# Patient Record
Sex: FEMALE | Race: WHITE | NOT HISPANIC OR LATINO | URBAN - METROPOLITAN AREA
[De-identification: names, ages, dates, MRNs, and addresses within clinical notes are randomized per-mention and may not be internally consistent; named-entity substitution may affect disease eponyms.]

---

## 2021-03-23 ENCOUNTER — BULK ORDERING (OUTPATIENT)
Dept: CASE MANAGEMENT | Facility: OTHER | Age: 56
End: 2021-03-23

## 2021-03-23 DIAGNOSIS — Z23 IMMUNIZATION DUE: ICD-10-CM

## 2023-12-04 ENCOUNTER — OFFICE VISIT (OUTPATIENT)
Dept: ENDOCRINOLOGY | Facility: CLINIC | Age: 58
End: 2023-12-04
Payer: COMMERCIAL

## 2023-12-04 VITALS
HEART RATE: 89 BPM | DIASTOLIC BLOOD PRESSURE: 90 MMHG | HEIGHT: 68 IN | BODY MASS INDEX: 25.85 KG/M2 | OXYGEN SATURATION: 99 % | WEIGHT: 170.6 LBS | SYSTOLIC BLOOD PRESSURE: 138 MMHG

## 2023-12-04 DIAGNOSIS — E04.2 MULTIPLE THYROID NODULES: Primary | ICD-10-CM

## 2023-12-04 RX ORDER — TIRZEPATIDE 5 MG/.5ML
INJECTION, SOLUTION SUBCUTANEOUS
COMMUNITY
Start: 2023-10-31

## 2023-12-04 NOTE — PROGRESS NOTES
Chief Complaint   Patient presents with    Thyroid Nodule        Referring Provider  Shireen Landrum,*     HPI   Martina Rodríguez is a 58 y.o. female had concerns including Thyroid Nodule.   Seen as a new patient.  Thyroid Nodule.    She is currently taking Mounjaro and they did an US Thyroid as preventative care and noticed thyroid nodule. They sent her here for further eval.  She denies any difficulty swallowing.  She has not noted changes to her neck.      US Thyroid: 04/2023  Findings: The longitudinal and transverse sonograms of the thyroid show approximately a 5 mm calcified nodule in the right lobe.  This may be due to a calcified goiter.  The left lobe has approximately 1 cm x 6.3 mm hypoechoic nodule.  This may be due to goiter or any other nodule.  Another 3.2 mm x 4.2 mm cyst is noted in the left lobe.  The isthmus is remarkable.  The right lobe measures 4.25 x 1.25 x 1.25 cm.  The left lobe measures 3.9 cm x 1.3 cm x 1.6 cm.  Conclusion: Bilateral thyroid nodules as mentioned above.  Clinical correlation and follow-up will be useful.    [place]  Previous history of radiation to face/neck: none  Consuming foods high in iodine: none  Family history of thyroid complications:none    The following portions of the patient's history were reviewed and updated as appropriate: allergies, current medications, past family history, past medical history, past social history, past surgical history, and problem list.  History reviewed. No pertinent past medical history.  History reviewed. No pertinent surgical history.   History reviewed. No pertinent family history.   Social History     Socioeconomic History    Marital status:    Tobacco Use    Smoking status: Never     Passive exposure: Never    Smokeless tobacco: Never   Substance and Sexual Activity    Alcohol use: Never    Drug use: Never    Sexual activity: Defer      No Known Allergies   Current Outpatient Medications on File Prior to Visit  "  Medication Sig Dispense Refill    Mounjaro 5 MG/0.5ML solution pen-injector INJECT 5 MG UNDER THE SKIN ONCE A WEEK       No current facility-administered medications on file prior to visit.      Review of Systems   Constitutional:  Positive for fatigue. Negative for unexpected weight loss.   HENT:  Negative for trouble swallowing.    Eyes: Negative.    Endocrine: Negative.    Skin:         Hair loss   Psychiatric/Behavioral:  Positive for stress.    All other systems reviewed and are negative.    /90 (BP Location: Left arm, Patient Position: Sitting, Cuff Size: Adult)   Pulse 89   Ht 172.7 cm (68\")   Wt 77.4 kg (170 lb 9.6 oz)   SpO2 99%   BMI 25.94 kg/m²      Physical Exam  Vitals reviewed.   Constitutional:       Appearance: Normal appearance.   HENT:      Head: Head contusion: thinned hair noted to overall head..      Comments: Thinned hair noted to overall head.  Eyes:      Extraocular Movements: Extraocular movements intact.   Neck:      Thyroid: No thyroid mass, thyromegaly or thyroid tenderness.   Cardiovascular:      Rate and Rhythm: Normal rate.   Pulmonary:      Effort: Pulmonary effort is normal.   Neurological:      General: No focal deficit present.      Mental Status: She is alert and oriented to person, place, and time.   Psychiatric:         Mood and Affect: Mood normal.         Behavior: Behavior normal.         Thought Content: Thought content normal.         Judgment: Judgment normal.       Labs/Imaging  CMP  Lab Results   Component Value Date    GLUCOSE 169 (H) 07/13/2015    BUN 17 07/13/2015    CREATININE 1.11 07/13/2015    BCR 15.7 07/13/2015    K 3.8 07/13/2015    CO2 24.3 07/13/2015    CALCIUM 9.3 07/13/2015        CBC w/DIFF   Lab Results   Component Value Date    WBC 7.8 07/13/2015    RBC 4.47 07/13/2015    HGB 13.4 07/13/2015    HCT 41.0 07/13/2015    MCV 91.7 07/13/2015    MCH 30.0 07/13/2015    MCHC 32.7 (L) 07/13/2015    RDW 13.3 07/13/2015    MPV 9.4 07/13/2015     " "(H) 07/13/2015    NEUTRORELPCT 69.4 07/13/2015    LYMPHORELPCT 18.3 (L) 07/13/2015    MONORELPCT 10.0 07/13/2015    EOSRELPCT 1.4 07/13/2015    BASORELPCT 0.6 07/13/2015    NEUTROABS 5.4 07/13/2015    LYMPHSABS 1.4 07/13/2015    MONOSABS 0.8 07/13/2015    EOSABS 0.1 07/13/2015    BASOSABS 0.1 07/13/2015       TSH  No results found for: \"TSH\"    T4  No results found for: \"FREET4\"  No results found for: \"N3HANLB\"    T3  No results found for: \"T3FREE\"  No results found for: \"U8GCFFF\"    TRAb  No results found for: \"TSURCPAB\"    TPO  No results found for: \"THYROIDAB\"    No valid procedures specified.    Assessment and Plan    Diagnoses and all orders for this visit:    1. Multiple thyroid nodules (Primary)  Assessment & Plan:  Discussed findings of ultrasound with patient.  We will obtain repeat ultrasound thyroid to determine any changes noted to the nodules.  This will determine if biopsy is indicated.  Follow-up in 1 year for repeat ultrasound to follow nodules or sooner as indicated.    Orders:  -     US Thyroid         Return in about 1 year (around 12/4/2024) for Follow-up appointment. The patient was instructed to contact the clinic with any interval questions or concerns.      This document has been electronically signed by TANI Tsai  December 4, 2023 12:46 EST   Endocrinology    Please note that portions of this document were completed with a voice recognition program. Efforts were made to edit the dictations, but occasionally words are mis-transcribed.   "

## 2023-12-04 NOTE — ASSESSMENT & PLAN NOTE
Discussed findings of ultrasound with patient.  We will obtain repeat ultrasound thyroid to determine any changes noted to the nodules.  This will determine if biopsy is indicated.  Follow-up in 1 year for repeat ultrasound to follow nodules or sooner as indicated.

## 2023-12-07 ENCOUNTER — PATIENT ROUNDING (BHMG ONLY) (OUTPATIENT)
Dept: ENDOCRINOLOGY | Facility: CLINIC | Age: 58
End: 2023-12-07
Payer: COMMERCIAL

## 2023-12-07 NOTE — PROGRESS NOTES
A [employer] message has been sent to the patient for patient rounding with [facility]

## 2024-02-22 ENCOUNTER — HOSPITAL ENCOUNTER (OUTPATIENT)
Facility: HOSPITAL | Age: 59
Discharge: HOME | End: 2024-02-22
Admitting: NURSE PRACTITIONER
Payer: COMMERCIAL

## 2024-02-22 PROCEDURE — 76536 US EXAM OF HEAD AND NECK: CPT

## 2024-02-22 PROCEDURE — 76536 US EXAM OF HEAD AND NECK: CPT | Performed by: RADIOLOGY

## 2024-12-04 ENCOUNTER — OFFICE VISIT (OUTPATIENT)
Dept: ENDOCRINOLOGY | Facility: CLINIC | Age: 59
End: 2024-12-04
Payer: COMMERCIAL

## 2024-12-04 VITALS
SYSTOLIC BLOOD PRESSURE: 159 MMHG | WEIGHT: 166.2 LBS | OXYGEN SATURATION: 99 % | BODY MASS INDEX: 25.27 KG/M2 | DIASTOLIC BLOOD PRESSURE: 83 MMHG | HEART RATE: 83 BPM

## 2024-12-04 DIAGNOSIS — E04.2 MULTIPLE THYROID NODULES: Primary | ICD-10-CM

## 2024-12-04 LAB
T4 FREE SERPL-MCNC: 1.36 NG/DL (ref 0.92–1.68)
TSH SERPL DL<=0.05 MIU/L-ACNC: 2.04 UIU/ML (ref 0.27–4.2)

## 2024-12-04 PROCEDURE — 84443 ASSAY THYROID STIM HORMONE: CPT | Performed by: NURSE PRACTITIONER

## 2024-12-04 PROCEDURE — 84439 ASSAY OF FREE THYROXINE: CPT | Performed by: NURSE PRACTITIONER

## 2024-12-04 NOTE — PROGRESS NOTES
Chief Complaint   Patient presents with    Multiple thyroid nodules        Referring Provider  No ref. provider found     HPI   Martina Rodríguez is a 59 y.o. female had concerns including Multiple thyroid nodules.   Thyroid Nodules.    She denies any changes since her last visit.  She denies any compressive s/sx's.    History:  She is currently taking Mounjaro and they did an US Thyroid as preventative care and noticed thyroid nodule. They sent her here for further eval.  She denies any difficulty swallowing.  She has not noted changes to her neck.      US Thyroid: 04/2023  Findings: The longitudinal and transverse sonograms of the thyroid show approximately a 5 mm calcified nodule in the right lobe.  This may be due to a calcified goiter.  The left lobe has approximately 1 cm x 6.3 mm hypoechoic nodule.  This may be due to goiter or any other nodule.  Another 3.2 mm x 4.2 mm cyst is noted in the left lobe.  The isthmus is remarkable.  The right lobe measures 4.25 x 1.25 x 1.25 cm.  The left lobe measures 3.9 cm x 1.3 cm x 1.6 cm.  Conclusion: Bilateral thyroid nodules as mentioned above.  Clinical correlation and follow-up will be useful.    [place]  Previous history of radiation to face/neck: none  Consuming foods high in iodine: none  Family history of thyroid complications:none    The following portions of the patient's history were reviewed and updated as appropriate: allergies, current medications, past family history, past medical history, past social history, past surgical history, and problem list.  History reviewed. No pertinent past medical history.  History reviewed. No pertinent surgical history.   History reviewed. No pertinent family history.   Social History     Socioeconomic History    Marital status:    Tobacco Use    Smoking status: Never     Passive exposure: Never    Smokeless tobacco: Never   Substance and Sexual Activity    Alcohol use: Never    Drug use: Never    Sexual activity:  Defer      No Known Allergies   Current Outpatient Medications on File Prior to Visit   Medication Sig Dispense Refill    Mounjaro 5 MG/0.5ML solution pen-injector INJECT 5 MG UNDER THE SKIN ONCE A WEEK       No current facility-administered medications on file prior to visit.      Review of Systems   Constitutional:  Positive for fatigue. Negative for unexpected weight loss.   HENT:  Negative for trouble swallowing.    Eyes: Negative.    Endocrine: Negative.    Skin:         Hair loss   Psychiatric/Behavioral:  Positive for stress.    All other systems reviewed and are negative.    /83 (BP Location: Right arm, Patient Position: Sitting, Cuff Size: Small Adult)   Pulse 83   Wt 75.4 kg (166 lb 3.2 oz)   SpO2 99%   BMI 25.27 kg/m²      Physical Exam  Vitals reviewed.   Constitutional:       Appearance: Normal appearance.   HENT:      Head: Head contusion: thinned hair noted to overall head..      Comments: Thinned hair noted to overall head.  Eyes:      Extraocular Movements: Extraocular movements intact.   Neck:      Thyroid: No thyroid mass, thyromegaly or thyroid tenderness.   Cardiovascular:      Rate and Rhythm: Normal rate.   Pulmonary:      Effort: Pulmonary effort is normal.   Neurological:      General: No focal deficit present.      Mental Status: She is alert and oriented to person, place, and time.   Psychiatric:         Mood and Affect: Mood normal.         Behavior: Behavior normal.         Thought Content: Thought content normal.         Judgment: Judgment normal.       Labs/Imaging  CMP  Lab Results   Component Value Date    GLUCOSE 169 (H) 07/13/2015    BUN 17 07/13/2015    CREATININE 1.11 07/13/2015    BCR 15.7 07/13/2015    K 3.8 07/13/2015    CO2 24.3 07/13/2015    CALCIUM 9.3 07/13/2015        CBC w/DIFF   Lab Results   Component Value Date    WBC 7.8 07/13/2015    RBC 4.47 07/13/2015    HGB 13.4 07/13/2015    HCT 41.0 07/13/2015    MCV 91.7 07/13/2015    MCH 30.0 07/13/2015    MCHC 32.7  "(L) 07/13/2015    RDW 13.3 07/13/2015    MPV 9.4 07/13/2015     (H) 07/13/2015    NEUTRORELPCT 69.4 07/13/2015    LYMPHORELPCT 18.3 (L) 07/13/2015    MONORELPCT 10.0 07/13/2015    EOSRELPCT 1.4 07/13/2015    BASORELPCT 0.6 07/13/2015    NEUTROABS 5.4 07/13/2015    LYMPHSABS 1.4 07/13/2015    MONOSABS 0.8 07/13/2015    EOSABS 0.1 07/13/2015    BASOSABS 0.1 07/13/2015       TSH  Lab Results   Component Value Date    TSH 2.040 12/04/2024       T4  Lab Results   Component Value Date    FREET4 1.36 12/04/2024     No results found for: \"S5BOCKT\"    T3  No results found for: \"T3FREE\"  No results found for: \"C0MUNZX\"    TRAb  No results found for: \"TSURCPAB\"    TPO  No results found for: \"THYROIDAB\"    No valid procedures specified.    Assessment and Plan    Diagnoses and all orders for this visit:    1. Multiple thyroid nodules (Primary)  Assessment & Plan:  -Clinically euthyroid.  -TFT's today with medication as indicated.  -Will obtain US Thyroid to monitor for size changes.  -Follow-up in 1 year.    Orders:  -     TSH  -     T4, free  -     US Thyroid; Future           Return in about 1 year (around 12/4/2025) for Follow-up appointment. The patient was instructed to contact the clinic with any interval questions or concerns.      This document has been electronically signed by TANI Tsai  December 6, 2024 23:13 EST   Endocrinology    Please note that portions of this document were completed with a voice recognition program. Efforts were made to edit the dictations, but occasionally words are mis-transcribed.   "

## 2024-12-07 NOTE — ASSESSMENT & PLAN NOTE
-Clinically euthyroid.  -TFT's today with medication as indicated.  -Will obtain US Thyroid to monitor for size changes.  -Follow-up in 1 year.

## 2025-02-28 ENCOUNTER — HOSPITAL ENCOUNTER (OUTPATIENT)
Facility: HOSPITAL | Age: 60
Discharge: HOME | End: 2025-02-28
Admitting: NURSE PRACTITIONER
Payer: COMMERCIAL

## 2025-02-28 DIAGNOSIS — E04.2 MULTIPLE THYROID NODULES: ICD-10-CM

## 2025-02-28 PROCEDURE — 76536 US EXAM OF HEAD AND NECK: CPT | Performed by: RADIOLOGY

## 2025-02-28 PROCEDURE — 76536 US EXAM OF HEAD AND NECK: CPT
